# Patient Record
Sex: MALE | Race: WHITE | NOT HISPANIC OR LATINO | Employment: FULL TIME | URBAN - METROPOLITAN AREA
[De-identification: names, ages, dates, MRNs, and addresses within clinical notes are randomized per-mention and may not be internally consistent; named-entity substitution may affect disease eponyms.]

---

## 2018-06-11 ENCOUNTER — OFFICE VISIT (OUTPATIENT)
Dept: INTERNAL MEDICINE | Facility: MEDICAL CENTER | Age: 23
End: 2018-06-11
Payer: COMMERCIAL

## 2018-06-11 VITALS
HEART RATE: 80 BPM | OXYGEN SATURATION: 98 % | BODY MASS INDEX: 19.03 KG/M2 | WEIGHT: 143.6 LBS | TEMPERATURE: 98.3 F | DIASTOLIC BLOOD PRESSURE: 73 MMHG | SYSTOLIC BLOOD PRESSURE: 106 MMHG | HEIGHT: 73 IN

## 2018-06-11 DIAGNOSIS — B07.0 PLANTAR WART: ICD-10-CM

## 2018-06-11 DIAGNOSIS — Z76.89 ENCOUNTER TO ESTABLISH CARE: ICD-10-CM

## 2018-06-11 DIAGNOSIS — M79.671 FOOT PAIN, RIGHT: ICD-10-CM

## 2018-06-11 PROCEDURE — 99204 OFFICE O/P NEW MOD 45 MIN: CPT | Mod: GC | Performed by: INTERNAL MEDICINE

## 2018-06-11 ASSESSMENT — PAIN SCALES - GENERAL: PAINLEVEL: 8=MODERATE-SEVERE PAIN

## 2018-06-11 ASSESSMENT — PATIENT HEALTH QUESTIONNAIRE - PHQ9: CLINICAL INTERPRETATION OF PHQ2 SCORE: 0

## 2018-06-11 NOTE — PROGRESS NOTES
New Patient    Mr Israel presents today to establish care:    CC: foot pain     HPI: Pt is a 22 y.o. male with PMH significant of anxiety and depression   Presented to the clinic as a new pt, with health concerns as follows    Foot pain: pt has R foot pain on the little toe x 2 months. No preceding trauma, no change in the shoes. Pt has some growth/corn on the planter surface which appears to be the most painful part. Since he is trying to keep the balance on the great toe, great toe hurts too ans developing a corn.     No other complain. 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   Non smoker, occasional EtOH, no recreational drug use.   FHx - father  of heart failure, was a methamphetamine user.    Patient Active Problem List    Diagnosis Date Noted   • Plantar wart 2018       Past Medical History:   Diagnosis Date   • Anxiety and depression        No current outpatient prescriptions on file.     No current facility-administered medications for this visit.        Allergies as of 2018   • (No Known Allergies)       Social History     Social History   • Marital status: Single     Spouse name: N/A   • Number of children: N/A   • Years of education: N/A     Occupational History   •        resturent      Social History Main Topics   • Smoking status: Never Smoker   • Smokeless tobacco: Never Used   • Alcohol use 1.2 oz/week     2 Cans of beer per week      Comment: 10 drinks a week    • Drug use: No   • Sexual activity: Yes     Partners: Female     Other Topics Concern   • Not on file     Social History Narrative   • No narrative on file       Family History   Problem Relation Age of Onset   • Heart Failure Father 42      in surgery for heart transplant        Past Surgical History:   Procedure Laterality Date   • OTHER      oral surgery         ROS: As per HPI.  All other systems reviewed and were negative.  Constitutional:  "negative for fever/ chills,  Malaise, weight loss and diaphoresis.   HENT: Negative for nosebleeds and sore throat.    Eyes: Negative for blurred vision and redness, vision loss  Respiratory: Negative for cough, hemoptysis and shortness of breath.    Cardiovascular: Negative for chest pain, palpitations, orthopnea, LUX and leg swelling   Gastrointestinal: Negative for heartburn, nausea, vomiting and abdominal pain.   Genitourinary: Negative for dysuria, frequency and hematuria.   Musculoskeletal: negative for joint swelling, pain. Negative for myalgias.  Skin: Negative for itching and rash.   Neurological: Negative for dizziness, tingling, sensory change, focal weakness, seizures and weakness.   Endo/Heme/Allergies: Does not bruise/bleed easily.   Psychiatric/Behavioral: Negative for depression and substance abuse.       /73   Pulse 80   Temp 36.8 °C (98.3 °F)   Ht 1.854 m (6' 1\")   Wt 65.1 kg (143 lb 9.6 oz)   SpO2 98%   BMI 18.95 kg/m²     Physical Exam   General: pt appears of stated age. Well developed and nourished. Pleasant and no apparent distress  Constitutional:  Weight same from last visit. Alert and  oriented to person, place, and time.  HENT  Eyes: Pupils are equal, round, and reactive to light. No scleral icterus. conjuctiva clear  Neck: Neck supple. No thyromegaly present. JVD  Cardiovascular: Normal rate, regular rhythm and normal heart sounds.  Exam reveals no gallop and no friction rub.  No murmur heard.  Pulmonary/Chest: b/l Breath sounds normal. Chest wall is not dull to percussion.   Musculoskeletal:   Joint appear normal, full ROM  Lymphadenopathy: no cervical adenopathy  Extremity: Distal pulses present. No edema noted  Neurological: alert and oriented to person, place, and time.   Skin: No cyanosis. Nails show no clubbing.    Labs  (reviewd)  CBC  CMP  Lipid    Imaging   reviewed    Note: I have reviewed all pertinent labs and diagnostic tests associated with this visit with " specific comments listed under the assessment and plan below    Assessment and Plan     1. Foot pain, right  2. Plantar wart  - pain is likely from the planter warts.   - education material on planter warts, use of OTC salicylic acid on warts to remove.  - referral to podiatry for removal       3. Encounter to establish care  - pt is in good health over all,  vaccination- he reports receiving all childhood immunization with Td with in 8-9 years. Went school in [employer] [facility].  - labs- will defer at this time as he is doing self pay for the visit and other medical services.             Followup: Return in about 1 year (around 6/11/2019).    Signed by: Karuna Traore M.D.

## 2018-06-11 NOTE — PATIENT INSTRUCTIONS
You can try OTC Salicylic acid    Plantar Warts  Introduction  Warts are small growths on the skin. They can occur on various areas of the body. When they occur on the underside (sole) of the foot, they are called plantar warts. Plantar warts often occur in groups, with several small warts around a larger growth. They tend to develop over areas of pressure, such as the heel or the ball of the foot.  Most warts are not painful, and they usually do not cause problems. However, plantar warts may cause pain when you walk because pressure is applied to them. Warts often go away on their own in time. Various treatments may be done if needed. Sometimes, warts go away and then they come back again.  What are the causes?  Plantar warts are caused by a type of virus that is called human papillomavirus (HPV). HPV attacks a break in the skin of the foot. Walking barefoot can lead to exposure to the virus. These warts may spread to other areas of the sole. They spread to other areas of the body only through direct contact.  What increases the risk?  Plantar warts are more likely to develop in:  · People who are 10-20 years of age.  · People who use public showers or locker rooms.  · People who have a weakened body defense system (immune system).  What are the signs or symptoms?  Plantar warts may be flat or slightly raised. They may grow into the deeper layers of skin or rise above the surface of the skin. Most plantar warts have a rough surface. They may cause pain when you use your foot to support your body weight.  How is this diagnosed?  A plantar wart can usually be diagnosed from its appearance. In some cases, a tissue sample may be removed (biopsy) to be looked at under a microscope.  How is this treated?  In many cases, warts do not need treatment. Without treatment, they often go away over a period of many months to a couple years. If treatment is needed, options may include:  · Applying medicated solutions, creams, or  patches to the wart. These may be over-the-counter or prescription medicines that make the skin soft so that layers will gradually shed away. In many cases, the medicine is applied one or two times per day and covered with a bandage.  · Putting duct tape over the top of the wart (occlusion). You will leave the tape in place for as long as told by your health care provider, then you will replace it with a new strip of tape. This is done until the wart goes away.  · Freezing the wart with liquid nitrogen (cryotherapy).  · Burning the wart with:  ¨ Laser treatment.  ¨ An electrified probe (electrocautery).  · Injection of a medicine (Candida antigen) into the wart to help the body's immune system to fight off the wart.  · Surgery to remove the wart.  Follow these instructions at home:  · Apply medicated creams or solutions only as told by your health care provider. This may involve:  ¨ Soaking the affected area in warm water.  ¨ Removing the top layer of softened skin before you apply the medicine. A pumice stone works well for removing the tissue.  ¨ Applying a bandage over the affected area after you apply the medicine.  ¨ Repeating the process daily or as told by your health care provider.  · Do not scratch or pick at a wart.  · Wash your hands after you touch a wart.  · If a wart is painful, try applying a bandage with a hole in the middle over the wart. The helps to take pressure off the wart.  · Keep all follow-up visits as told by your health care provider. This is important.  How is this prevented?  Take these actions to help prevent warts:  · Wear shoes and socks. Change your socks daily.  · Keep your feet clean and dry.  · Check your feet regularly.  · Avoid direct contact with warts on other people.  Contact a health care provider if:  · Your warts do not improve after treatment.  · You have redness, swelling, or pain at the site of a wart.  · You have bleeding from a wart that does not stop with light  pressure.  · You have diabetes and you develop a wart.  This information is not intended to replace advice given to you by your health care provider. Make sure you discuss any questions you have with your health care provider.  Document Released: 03/09/2005 Document Revised: 05/25/2017 Document Reviewed: 03/14/2016  © 2017 Elsevier

## 2019-01-24 ENCOUNTER — OFFICE VISIT (OUTPATIENT)
Dept: INTERNAL MEDICINE | Facility: MEDICAL CENTER | Age: 24
End: 2019-01-24
Payer: COMMERCIAL

## 2019-01-24 VITALS
TEMPERATURE: 98.6 F | DIASTOLIC BLOOD PRESSURE: 70 MMHG | HEIGHT: 73 IN | SYSTOLIC BLOOD PRESSURE: 109 MMHG | HEART RATE: 56 BPM | WEIGHT: 138.2 LBS | OXYGEN SATURATION: 97 % | BODY MASS INDEX: 18.32 KG/M2

## 2019-01-24 DIAGNOSIS — F41.1 GENERALIZED ANXIETY DISORDER: ICD-10-CM

## 2019-01-24 PROCEDURE — 99213 OFFICE O/P EST LOW 20 MIN: CPT | Mod: GE | Performed by: INTERNAL MEDICINE

## 2019-01-24 RX ORDER — HYDROXYZINE HYDROCHLORIDE 25 MG/1
25 TABLET, FILM COATED ORAL 3 TIMES DAILY PRN
Qty: 30 TAB | Refills: 1 | Status: SHIPPED | OUTPATIENT
Start: 2019-01-24

## 2019-01-24 RX ORDER — ESCITALOPRAM OXALATE 10 MG/1
10 TABLET ORAL DAILY
Qty: 30 TAB | Refills: 0 | Status: SHIPPED | OUTPATIENT
Start: 2019-01-24 | End: 2019-02-19 | Stop reason: SDUPTHER

## 2019-01-24 ASSESSMENT — PAIN SCALES - GENERAL: PAINLEVEL: NO PAIN

## 2019-01-24 NOTE — PATIENT INSTRUCTIONS
Take lexapro 10 mg once a day   Take atarax 1 tab as need for anxiety  F/u in 4 weeks for the dose adjustment

## 2019-01-24 NOTE — PROGRESS NOTES
"      Established Patient    Max presents today with the following:    CC: anxiety    HPI: pt is a 24 YO male with hx of anxiety in the past. He has been on Lexapro in the past with good control. He is off of his med from past 18 months and is dealing with significant anxiety. His anxiety is usually related to his work, career and finances. Has tried CBT before and stopped due to high copay, but is willing to try again. Pt wants to t go back on lexapro.  Denies ant SI/HI.  He is functional, works at [employer] late evening shifts.     Patient Active Problem List    Diagnosis Date Noted   • Generalized anxiety disorder 01/24/2019   • Plantar wart 06/11/2018       No current outpatient prescriptions on file.     No current facility-administered medications for this visit.        ROS: As per HPI. Additional pertinent systems as noted below.  Constitutional: Negative for chills and fever.   HENT: Negative for ear pain and sore throat.    Eyes: Negative for discharge and redness.   Respiratory: Negative for cough, hemoptysis, wheezing and stridor.    Cardiovascular: Negative for chest pain, palpitations and leg swelling.   Gastrointestinal: Negative for abdominal pain, constipation, diarrhea, heartburn, nausea and vomiting.   Genitourinary: Negative for dysuria, flank pain and hematuria.   Musculoskeletal: Negative for falls and myalgias.   Skin: Negative for itching and rash.   Neurological: Negative for dizziness, seizures, loss of consciousness and headaches.   Endo/Heme/Allergies: Negative for polydipsia. Does not bruise/bleed easily.   Psychiatric/Behavioral: Negative for substance abuse and suicidal ideas.       /70 (BP Location: Left arm, Patient Position: Sitting)   Pulse (!) 56   Temp 37 °C (98.6 °F) (Temporal)   Ht 1.854 m (6' 1\")   Wt 62.7 kg (138 lb 3.2 oz)   SpO2 97%   BMI 18.23 kg/m²     Physical Exam   Constitutional:  oriented to person, place, and time. No distress.   Eyes: Pupils are equal, " round, and reactive to light. No scleral icterus.  Neck: Neck supple. No thyromegaly present.   Cardiovascular: Normal rate, regular rhythm and normal heart sounds.  Exam reveals no gallop and no friction rub.  No murmur heard.  Pulmonary/Chest: Breath sounds normal. Chest wall is not dull to percussion.   Musculoskeletal:   no edema.   Lymphadenopathy: no cervical adenopathy  Neurological: alert and oriented to person, place, and time.   Skin: No cyanosis. Nails show no clubbing.      Note: I have reviewed all pertinent labs and diagnostic tests associated with this visit with specific comments listed under the assessment and plan below    Assessment and Plan    1. Generalized anxiety disorder  GRETA 7 score 14.  - initiate lexapro 10 mg   - atarax prn  - referral to CBT  - f/u in 4 weeks       Followup: Return in about 4 weeks (around 2/21/2019).      Signed by: Karuna Traore M.D.

## 2019-02-19 DIAGNOSIS — F41.1 GENERALIZED ANXIETY DISORDER: ICD-10-CM

## 2019-02-19 NOTE — TELEPHONE ENCOUNTER
1. Caller Name: Hugh Israel                                         Call Back Number: [phone] (home)         Patient approves a detailed voicemail message: yes    Pt requesting rx rf of lexapro. Pt has an appt on 3/18/2019 but was only given a 30 day supply and will be out before his next visit.     pcp please advise

## 2019-02-20 RX ORDER — ESCITALOPRAM OXALATE 10 MG/1
10 TABLET ORAL DAILY
Qty: 30 TAB | Refills: 0 | Status: SHIPPED | OUTPATIENT
Start: 2019-02-20

## 2019-02-25 DIAGNOSIS — F41.1 GENERALIZED ANXIETY DISORDER: ICD-10-CM

## 2019-02-25 NOTE — TELEPHONE ENCOUNTER
Last seen: 01/24/19 by Dr. Traore  Next appt: 03/18/19 with Dr. Traore    Was the patient seen in the last year in this department? Yes   Does patient have an active prescription for medications requested? No   Received Request Via: Pharmacy

## 2019-02-26 RX ORDER — ESCITALOPRAM OXALATE 10 MG/1
TABLET ORAL
Qty: 90 TAB | Refills: 0 | Status: SHIPPED | OUTPATIENT
Start: 2019-02-26 | End: 2019-06-17

## 2019-03-18 ENCOUNTER — OFFICE VISIT (OUTPATIENT)
Dept: INTERNAL MEDICINE | Facility: MEDICAL CENTER | Age: 24
End: 2019-03-18
Payer: COMMERCIAL

## 2019-03-18 VITALS
HEIGHT: 73 IN | BODY MASS INDEX: 18.05 KG/M2 | OXYGEN SATURATION: 94 % | HEART RATE: 77 BPM | SYSTOLIC BLOOD PRESSURE: 113 MMHG | DIASTOLIC BLOOD PRESSURE: 65 MMHG | TEMPERATURE: 98.7 F | WEIGHT: 136.2 LBS

## 2019-03-18 DIAGNOSIS — Z13.0 SCREENING FOR ENDOCRINE, METABOLIC AND IMMUNITY DISORDER: ICD-10-CM

## 2019-03-18 DIAGNOSIS — Z13.228 SCREENING FOR ENDOCRINE, METABOLIC AND IMMUNITY DISORDER: ICD-10-CM

## 2019-03-18 DIAGNOSIS — F41.1 GENERALIZED ANXIETY DISORDER: ICD-10-CM

## 2019-03-18 DIAGNOSIS — Z13.29 SCREENING FOR ENDOCRINE, METABOLIC AND IMMUNITY DISORDER: ICD-10-CM

## 2019-03-18 DIAGNOSIS — Z00.00 HEALTH CARE MAINTENANCE: ICD-10-CM

## 2019-03-18 PROCEDURE — 99213 OFFICE O/P EST LOW 20 MIN: CPT | Mod: GE | Performed by: INTERNAL MEDICINE

## 2019-03-18 ASSESSMENT — PAIN SCALES - GENERAL: PAINLEVEL: NO PAIN

## 2019-03-18 NOTE — PROGRESS NOTES
Established Patient    Hugh presents today with the following:    CC: Follow-up on anxiety    HPI: Mr. Israel is a 23-year old very pleasant male presented to follow-up on anxiety.  He was started on Lexapro 10 mg on his last visit 4 weeks back, and at that time his GRETA 7 score was 14.  Patient was also given Atarax for acute anxiety symptom control and a referral for CBT.    He reports Lexapro is working very well for him and he feels much better.  He hardly have to use Atarax.  He did not go for COPD as he never received any call and could not find a number to call  to schedule CBT visit.    Denies any other complaint on today's visit.    Patient Active Problem List    Diagnosis Date Noted   • Generalized anxiety disorder 01/24/2019   • Plantar wart 06/11/2018       Current Outpatient Prescriptions   Medication Sig Dispense Refill   • escitalopram (LEXAPRO) 10 MG Tab Take 1 Tab by mouth every day. 30 Tab 0   • escitalopram (LEXAPRO) 10 MG Tab TAKE ONE TABLET BY MOUTH EVERY DAY 90 Tab 0   • hydrOXYzine HCl (ATARAX) 25 MG Tab Take 1 Tab by mouth 3 times a day as needed for Anxiety. 30 Tab 1     No current facility-administered medications for this visit.        ROS: As per HPI. Additional pertinent systems as noted below.  Constitutional: Negative for chills and fever.   HENT: Negative for ear pain and sore throat.    Eyes: Negative for discharge and redness.   Respiratory: Negative for cough, hemoptysis, wheezing and stridor.    Cardiovascular: Negative for chest pain, palpitations and leg swelling.   Gastrointestinal: Negative for abdominal pain, constipation, diarrhea, heartburn, nausea and vomiting.   Genitourinary: Negative for dysuria, flank pain and hematuria.   Musculoskeletal: Negative for falls and myalgias.   Skin: Negative for itching and rash.   Neurological: Negative for dizziness, seizures, loss of consciousness and headaches.   Endo/Heme/Allergies: Negative for polydipsia. Does not bruise/bleed  "easily.   Psychiatric/Behavioral: Negative for substance abuse and suicidal ideas.       /65 (BP Location: Right arm, Patient Position: Sitting)   Pulse 77   Temp 37.1 °C (98.7 °F) (Temporal)   Ht 1.854 m (6' 1\")   Wt 61.8 kg (136 lb 3.2 oz)   SpO2 94%   BMI 17.97 kg/m²     Physical Exam   Constitutional:  oriented to person, place, and time. No distress.   Eyes: Pupils are equal, round, and reactive to light. No scleral icterus.  Neck: Neck supple. No thyromegaly present.   Cardiovascular: Normal rate, regular rhythm and normal heart sounds.  Exam reveals no gallop and no friction rub.  No murmur heard.  Pulmonary/Chest: Breath sounds normal. Chest wall is not dull to percussion.   Musculoskeletal:   no edema.   Lymphadenopathy: no cervical adenopathy  Neurological: alert and oriented to person, place, and time.   Skin: No cyanosis. Nails show no clubbing.      Note: I have reviewed all pertinent labs and diagnostic tests associated with this visit with specific comments listed under the assessment and plan below    Assessment and Plan    1. Generalized anxiety disorder  -GRETA 7 score is 7 today, marked improvement on Lexapro 10 mg  -Discussed with patient about if dose escalation needed.  Patient agrees with current dose of 10 mg, continue for 6-month then reevaluate.  -Phone number for referral given.  -Follow-up in 6 months.    2. Screening for endocrine, metabolic and immunity disorder  3. Health care maintenance  -CBC, BMP  -Dermatology referral for annual skin cancer screening      Followup: Return in about 6 months (around 9/18/2019).      Signed by: Karuna Traore M.D.    "

## 2019-06-17 ENCOUNTER — OFFICE VISIT (OUTPATIENT)
Dept: INTERNAL MEDICINE | Facility: MEDICAL CENTER | Age: 24
End: 2019-06-17
Payer: COMMERCIAL

## 2019-06-17 VITALS
TEMPERATURE: 98.5 F | HEIGHT: 73 IN | OXYGEN SATURATION: 94 % | SYSTOLIC BLOOD PRESSURE: 113 MMHG | DIASTOLIC BLOOD PRESSURE: 70 MMHG | WEIGHT: 138.2 LBS | HEART RATE: 69 BPM | BODY MASS INDEX: 18.32 KG/M2

## 2019-06-17 DIAGNOSIS — H93.8X1 EAR FULLNESS, RIGHT: ICD-10-CM

## 2019-06-17 PROCEDURE — 99213 OFFICE O/P EST LOW 20 MIN: CPT | Mod: GE | Performed by: INTERNAL MEDICINE

## 2019-06-17 ASSESSMENT — PATIENT HEALTH QUESTIONNAIRE - PHQ9: CLINICAL INTERPRETATION OF PHQ2 SCORE: 0

## 2019-06-17 ASSESSMENT — PAIN SCALES - GENERAL: PAINLEVEL: NO PAIN

## 2019-06-17 NOTE — PROGRESS NOTES
Established Patient    Hugh presents today with the following:    CC: Ear fullness    HPI: Patient is a 23-year-old male presented to the clinic with complaint of ear fullness after swimming in the river.  Patient reports in the past he had a similar complaint after surfing and he used over-the-counter ear medication and ended up with the ear infection.  Patient wanted to assure that he does not have any ear infection.  He denies any fevers, chills or any other complaint with the fullness of the ear.  He denies any pain in the ear canal.  Or any discharge.    Patient Active Problem List    Diagnosis Date Noted   • Generalized anxiety disorder 01/24/2019   • Plantar wart 06/11/2018       Current Outpatient Prescriptions   Medication Sig Dispense Refill   • escitalopram (LEXAPRO) 10 MG Tab Take 1 Tab by mouth every day. 30 Tab 0   • hydrOXYzine HCl (ATARAX) 25 MG Tab Take 1 Tab by mouth 3 times a day as needed for Anxiety. 30 Tab 1     No current facility-administered medications for this visit.        ROS: As per HPI. Additional pertinent systems as noted below.  Constitutional: Negative for chills and fever.   HENT: Negative for ear pain and sore throat.    Eyes: Negative for discharge and redness.   Respiratory: Negative for cough, hemoptysis, wheezing and stridor.    Cardiovascular: Negative for chest pain, palpitations and leg swelling.   Gastrointestinal: Negative for abdominal pain, constipation, diarrhea, heartburn, nausea and vomiting.   Genitourinary: Negative for dysuria, flank pain and hematuria.   Musculoskeletal: Negative for falls and myalgias.   Skin: Negative for itching and rash.   Neurological: Negative for dizziness, seizures, loss of consciousness and headaches.   Endo/Heme/Allergies: Negative for polydipsia. Does not bruise/bleed easily.   Psychiatric/Behavioral: Negative for substance abuse and suicidal ideas.       /70 (BP Location: Left arm, Patient Position: Sitting)   Pulse 69    "Temp 36.9 °C (98.5 °F) (Temporal)   Ht 1.854 m (6' 1\")   Wt 62.7 kg (138 lb 3.2 oz)   SpO2 94%   BMI 18.23 kg/m²     Physical Exam   Constitutional:  oriented to person, place, and time. No distress.   Eyes: Pupils are equal, round, and reactive to light. No scleral icterus.  Ears: EAC full of cerumen bilaterally  Neck: Neck supple. No thyromegaly present.   Cardiovascular: Normal rate, regular rhythm and normal heart sounds.  Exam reveals no gallop and no friction rub.  No murmur heard.  Pulmonary/Chest: Breath sounds normal. Chest wall is not dull to percussion.   Musculoskeletal:   no edema.   Lymphadenopathy: no cervical adenopathy  Neurological: alert and oriented to person, place, and time.   Skin: No cyanosis. Nails show no clubbing.      Note: I have reviewed all pertinent labs and diagnostic tests associated with this visit with specific comments listed under the assessment and plan below    Assessment and Plan    1. Ear fullness, right  -EAC full of cerumen bilaterally  -Ear lavage complete with removal of cerumen of EAC  -EAC clean with mild rotation on the right side.  Tympanic membrane visible and WNL      Followup: Return in about 1 year (around 6/17/2020), or if symptoms worsen or fail to improve.      Signed by: Karuna Traore M.D.    "